# Patient Record
Sex: MALE | Race: BLACK OR AFRICAN AMERICAN
[De-identification: names, ages, dates, MRNs, and addresses within clinical notes are randomized per-mention and may not be internally consistent; named-entity substitution may affect disease eponyms.]

---

## 2020-06-21 ENCOUNTER — HOSPITAL ENCOUNTER (EMERGENCY)
Dept: HOSPITAL 72 - EMR | Age: 65
Discharge: ANOTHER HOSPITAL | End: 2020-06-21
Payer: COMMERCIAL

## 2020-06-21 VITALS — SYSTOLIC BLOOD PRESSURE: 119 MMHG | DIASTOLIC BLOOD PRESSURE: 78 MMHG

## 2020-06-21 VITALS — SYSTOLIC BLOOD PRESSURE: 94 MMHG | DIASTOLIC BLOOD PRESSURE: 67 MMHG

## 2020-06-21 VITALS — SYSTOLIC BLOOD PRESSURE: 83 MMHG | DIASTOLIC BLOOD PRESSURE: 60 MMHG

## 2020-06-21 VITALS — DIASTOLIC BLOOD PRESSURE: 88 MMHG | SYSTOLIC BLOOD PRESSURE: 125 MMHG

## 2020-06-21 VITALS — SYSTOLIC BLOOD PRESSURE: 116 MMHG | DIASTOLIC BLOOD PRESSURE: 90 MMHG

## 2020-06-21 VITALS — WEIGHT: 300 LBS | BODY MASS INDEX: 40.63 KG/M2 | HEIGHT: 72 IN

## 2020-06-21 VITALS — SYSTOLIC BLOOD PRESSURE: 92 MMHG | DIASTOLIC BLOOD PRESSURE: 54 MMHG

## 2020-06-21 VITALS — SYSTOLIC BLOOD PRESSURE: 107 MMHG | DIASTOLIC BLOOD PRESSURE: 79 MMHG

## 2020-06-21 DIAGNOSIS — J18.9: ICD-10-CM

## 2020-06-21 DIAGNOSIS — E11.9: ICD-10-CM

## 2020-06-21 DIAGNOSIS — I48.20: ICD-10-CM

## 2020-06-21 DIAGNOSIS — F10.929: Primary | ICD-10-CM

## 2020-06-21 DIAGNOSIS — E87.6: ICD-10-CM

## 2020-06-21 DIAGNOSIS — I10: ICD-10-CM

## 2020-06-21 DIAGNOSIS — E66.9: ICD-10-CM

## 2020-06-21 DIAGNOSIS — D72.829: ICD-10-CM

## 2020-06-21 LAB
ADD MANUAL DIFF: NO
ALBUMIN SERPL-MCNC: 2.6 G/DL (ref 3.4–5)
ALBUMIN/GLOB SERPL: 0.5 {RATIO} (ref 1–2.7)
ALP SERPL-CCNC: 77 U/L (ref 46–116)
ALT SERPL-CCNC: 24 U/L (ref 12–78)
ANION GAP SERPL CALC-SCNC: 13 MMOL/L (ref 5–15)
AST SERPL-CCNC: 31 U/L (ref 15–37)
BASOPHILS NFR BLD AUTO: 0.6 % (ref 0–2)
BILIRUB DIRECT SERPL-MCNC: 0.6 MG/DL (ref 0–0.3)
BILIRUB SERPL-MCNC: 1.3 MG/DL (ref 0.2–1)
BUN SERPL-MCNC: 13 MG/DL (ref 7–18)
CALCIUM SERPL-MCNC: 8.8 MG/DL (ref 8.5–10.1)
CHLORIDE SERPL-SCNC: 93 MMOL/L (ref 98–107)
CO2 SERPL-SCNC: 26 MMOL/L (ref 21–32)
CREAT SERPL-MCNC: 1.1 MG/DL (ref 0.55–1.3)
EOSINOPHIL NFR BLD AUTO: 0.2 % (ref 0–3)
ERYTHROCYTE [DISTWIDTH] IN BLOOD BY AUTOMATED COUNT: 13.8 % (ref 11.6–14.8)
GLOBULIN SER-MCNC: 5.1 G/DL
HCT VFR BLD CALC: 48.4 % (ref 42–52)
HGB BLD-MCNC: 16.5 G/DL (ref 14.2–18)
LYMPHOCYTES NFR BLD AUTO: 11.4 % (ref 20–45)
MCV RBC AUTO: 105 FL (ref 80–99)
MONOCYTES NFR BLD AUTO: 5.1 % (ref 1–10)
NEUTROPHILS NFR BLD AUTO: 82.8 % (ref 45–75)
PLATELET # BLD: 214 K/UL (ref 150–450)
POTASSIUM SERPL-SCNC: 2.8 MMOL/L (ref 3.5–5.1)
RBC # BLD AUTO: 4.63 M/UL (ref 4.7–6.1)
SODIUM SERPL-SCNC: 133 MMOL/L (ref 136–145)
WBC # BLD AUTO: 11.8 K/UL (ref 4.8–10.8)

## 2020-06-21 PROCEDURE — 84484 ASSAY OF TROPONIN QUANT: CPT

## 2020-06-21 PROCEDURE — 80053 COMPREHEN METABOLIC PANEL: CPT

## 2020-06-21 PROCEDURE — 85025 COMPLETE CBC W/AUTO DIFF WBC: CPT

## 2020-06-21 PROCEDURE — 96361 HYDRATE IV INFUSION ADD-ON: CPT

## 2020-06-21 PROCEDURE — 36415 COLL VENOUS BLD VENIPUNCTURE: CPT

## 2020-06-21 PROCEDURE — 96366 THER/PROPH/DIAG IV INF ADDON: CPT

## 2020-06-21 PROCEDURE — 87040 BLOOD CULTURE FOR BACTERIA: CPT

## 2020-06-21 PROCEDURE — 96368 THER/DIAG CONCURRENT INF: CPT

## 2020-06-21 PROCEDURE — 83605 ASSAY OF LACTIC ACID: CPT

## 2020-06-21 PROCEDURE — 83880 ASSAY OF NATRIURETIC PEPTIDE: CPT

## 2020-06-21 PROCEDURE — 80307 DRUG TEST PRSMV CHEM ANLYZR: CPT

## 2020-06-21 PROCEDURE — 82248 BILIRUBIN DIRECT: CPT

## 2020-06-21 PROCEDURE — 93005 ELECTROCARDIOGRAM TRACING: CPT

## 2020-06-21 PROCEDURE — 70450 CT HEAD/BRAIN W/O DYE: CPT

## 2020-06-21 PROCEDURE — 96365 THER/PROPH/DIAG IV INF INIT: CPT

## 2020-06-21 PROCEDURE — 99285 EMERGENCY DEPT VISIT HI MDM: CPT

## 2020-06-21 PROCEDURE — 71045 X-RAY EXAM CHEST 1 VIEW: CPT

## 2020-06-21 NOTE — NUR
ED Nurse Note:



Urine obtained by straight cath, sent to lab. Bindu held per Dr. Rod, 
HR at 101 currently.

## 2020-06-21 NOTE — NUR
ED Nurse Note:



Patient's HR currently 101. Breathing even and unlabored, no s/s of acute 
distress.

## 2020-06-21 NOTE — NUR
ED Nurse Note:

Report given to [facility]. Patient stable for transfer. IV intact and patent; 
IV fluids stop and detached for transport. Patient left with EMS via gurney. 
Belongings and transfer packet sent with patient.

## 2020-06-21 NOTE — NUR
ED Nurse Note:

Patient sleeping in bed with no acute distress. Remains in AFIB 110's BP 
125/88. Will continue to monitor closely. Awaiting EMS transport.

## 2020-06-21 NOTE — NUR
ED Nurse Note:



Patient resting in bed, patient confused at baseline. HR currently 98. Covid 
swab, lactic, and blood cultures sent to lab.

## 2020-06-21 NOTE — NUR
ED Nurse Note:

Received patient from MORGAN Castillo. Patient lethargic; arousable to painful 
stimuli. Attached to monitor; AFIB 's; hypotensive per baseline; received 
patient with BP 80/62 on left arm. pt in trendelenburg position; BP cuffed 
placed on right arm and reassessed BP 92/54. Patient presents with no 
respiratory distress. Placed gown on patient and repositioned for comfort. 
soiled clothing placed in belongings bag.

## 2020-06-21 NOTE — NUR
ED Nurse Note:



Patient BP currently 83/60, MAP is 68, patient having short runs of 
non-sustained v-tach, asymptomatic, Dr. Rod aware. Per Dr. Rod, 500 
mL bolus of NS started, patient in Trendelenberg position.

## 2020-06-21 NOTE — DIAGNOSTIC IMAGING REPORT
History: AMS

 

Exam: CT HEAD Without Contrast

Technique more: CTDI is 53.40 mGy and DLP is 1098.90 mGy-cm.

Technique more: One or more of the following dose reduction techniques 

were used: automated exposure control, adjustment of the mA and/or kV 

according to patient size, use of iterative reconstruction technique.

 

Comparison: None available

 

FINDINGS:

 

No intracranial hemorrhage, mass effect or CT evidence of acute infarct.  

Ventricles are within limits and midline.  Visualized paranasal sinuses 

and mastoids are clear.  Old left medial orbital wall fracture deformity.

 

IMPRESSION:

 

No intracranial hemorrhage, mass effect or CT evidence of acute infarct.

## 2020-06-21 NOTE — DIAGNOSTIC IMAGING REPORT
History: AMS

 

Exam: XR CXR 1 VIEW 4 images

 

Comparison: None available

 

FINDINGS:

 

Cardiac silhouette appears upper limits.  The right lung appears clear.  

Patchy opacity at the left base may represent atelectasis or developing 

infiltrate.

 

IMPRESSION:

 

Patchy opacity at the left base may represent atelectasis or developing 

infiltrate.

## 2020-06-21 NOTE — NUR
ED Nurse Note:



Called [facility] to give report. Per Clara MORA, she 
stated to call to give report after 1930 as there is no nurse to receive 
patient now.

## 2020-06-21 NOTE — NUR
HAND-OFF: 

Report given to See Muñoz RN. Patient resting in bed, NS w/ K+ running. Endorsed 
plan of care.ED Nurse Note:

## 2020-06-21 NOTE — NUR
ED Nurse Note:

Report given to MORGAN Borrego of [facility]. Patient to be admitted to TELE 
233 under the care of MD Jomar

## 2020-06-21 NOTE — NUR
ED Nurse Note:



Patient BIBA RA 29 d/t altered LOC. Patient's daughter called 911 d/t patient 
being less responsive today after drinking throughout the night. Patient  
per EMS in the field. Patient on the cardiac monitor, HR fluctuates between 
120's and 150's. Patient dozing in and out of sleep, unable to arouse for any 
significant length of time. Blood glucose 161 on Dr. Marcel molina 
aware. 18 g IV started in right AC, 20 g IV in right hand.

## 2020-06-21 NOTE — NUR
-------------------------------------------------------------------------------

          *** Note undone in EDM - 06/21/20 at 2000 by LCRISOSTOM ***          

-------------------------------------------------------------------------------

ED Nurse Note:

Received patient from MORGAN Castillo. Patient lethargic; arousable to painful 
stimuli. Attached to monitor; AFIB RVR 's; hypotensive per baseline; 
received patient with BP 80/62 on left arm. pt in trendelenburg position; BP 
cuffed placed on right arm and reassessed BP 92/54. Patient presents with no 
respiratory distress. Placed gown on patient and repositioned for comfort. 
soiled clothing placed in belongings bag.

## 2020-06-21 NOTE — EMERGENCY ROOM REPORT
History of Present Illness


General


Chief Complaint:  Alcohol Intoxication


Source:  Patient





Present Illness


HPI


64-year-old male presents for altered level of consciousness.  Brought in by 

EMS from home.  Daughter called 911.  States that he was drinking last night 

and was going in and out of consciousness.  Patient is lethargic but admits to 

alcohol use.  Denies drug use.  Unsure if he hit his head.  Per EMS patient is 

in A. fib to the 140s.  Daughter denies any history of A. fib.  Does not know 

what medications he takes.  Denies drug use.  Denies fevers or chills.  No 

other aggravating relieving factors.  Denies any other associated symptoms


Allergies:  


Coded Allergies:  


     No Known Allergies (Unverified , 6/21/20)





COVID-19 Screening


Contact w/high risk pt:  No


Recent Travel to affected area:  No


Experienced COVID-19 symptoms?:  No


COVID-19 Testing performed PTA:  No





Patient History


Past Medical History:  DM, HTN


Social History:  Reports: alcohol use; Denies: smoking, drug use


Immunizations:  UTD


Reviewed Nursing Documentation:  PMH: Agreed; PSxH: Agreed





Nursing Documentation-PMH


Past Medical History:  No History, Except For


Hx Hypertension:  Yes


Hx Diabetes:  Yes





Review of Systems


All Other Systems:  limited





Physical Exam





Vital Signs








  Date Time  Temp Pulse Resp B/P (MAP) Pulse Ox O2 Delivery O2 Flow Rate FiO2


 


6/21/20 14:36 98.1 143 20 107/79 (88) 98 Room Air  








Sp02 EP Interpretation:  reviewed, normal


General Appearance:  no apparent distress, GCS 15, non-toxic, lethargic, obese


Head:  normocephalic, atraumatic


Eyes:  bilateral eye normal inspection, bilateral eye PERRL, bilateral eye EOMI


ENT:  hearing grossly normal, normal pharynx, no angioedema, normal voice


Neck:  full range of motion, supple/symm/no masses


Respiratory:  chest non-tender, lungs clear, normal breath sounds, speaking 

full sentences


Cardiovascular #1:  regular rate, rhythm, no edema


Cardiovascular #2:  2+ carotid (R), 2+ carotid (L), 2+ radial (R), 2+ radial (L)

, 2+ dorsalis pedis (R), 2+ dorsalis pedis (L)


Gastrointestinal:  normal bowel sounds, non tender, soft, non-distended, no 

guarding, no rebound


Rectal:  deferred


Genitourinary:  normal inspection, no CVA tenderness


Musculoskeletal:  back normal, normal range of motion, gait/station normal, non-

tender


Neurologic:  motor strength/tone normal, responsive


Psychiatric:  judgement/insight normal, memory normal, mood/affect normal, no 

suicidal/homicidal ideation, other - Intoxicated


Reflexes:  3+ bicep (R), 3+ bicep (L), 3+ tricep (R), 3+ tricep (L), 3+ knee (R)

, 3+ knee (L)


Skin:  other - See nursing skin notes


Lymphatic:  no adenopathy





Medical Decision Making


Diagnostic Impression:  


 Primary Impression:  


 Acute alcoholic intoxication


 Qualified Codes:  F10.929 - Alcohol use, unspecified with intoxication, 

unspecified


 Additional Impressions:  


 Altered level of consciousness


 Hypokalemia


 A-fib


 Qualified Codes:  I48.91 - Unspecified atrial fibrillation


ER Course


Hospital Course 


64-year-old male presents with altered level of consciousness.  Reported EtOH





Differential diagnoses include: MI/unstable angina, contusion, muscle strain, 

PTX, rib fracture, pneumonia, 





Clinical course


Patient placed on stretcher. on cardiac monitor which shows A. fib with RVR.   

After initial history and physical I ordered labs, EKG, chest x-ray, CT head





lopressor ordered however patient converted prior to receiving medication





labs reviewed-noted leukocytosis, hemoglobin/hematocrit ok, potassium 2.8, 

troponin negative, EtOH negative, drug screen negative


Chest x-ray- questionable left lower lobe infiltrate


CT head no acute process





Antibiotics given.  COVID swab sent.  Potassium repleted.  Remains lethargic.  

New onset A. fib.  Will require cardiac evaluation.  Discussed findings with 

daughter





Because of insurance patient will be transferred





I.  I feel this is a highly complex case requiring extensive working including 

EKG/Rhythm strip, Xray/CT/US, Blood/urine lab work, repeat exams while in ED, 

and administration of strong opiates/narcotics for pain control, admission to 

hospital or close patient follow up.  





Diagnosis -alcohol intoxication, altered level of consciousness, hypokalemia, 

A. fib





Transferred in serious condition





Labs








Test


  6/21/20


14:55 6/21/20


15:45


 


White Blood Count


  11.8 K/UL


(4.8-10.8) 


 


 


Red Blood Count


  4.63 M/UL


(4.70-6.10) 


 


 


Hemoglobin


  16.5 G/DL


(14.2-18.0) 


 


 


Hematocrit


  48.4 %


(42.0-52.0) 


 


 


Mean Corpuscular Volume 105 FL (80-99)  


 


Mean Corpuscular Hemoglobin


  35.6 PG


(27.0-31.0) 


 


 


Mean Corpuscular Hemoglobin


Concent 34.0 G/DL


(32.0-36.0) 


 


 


Red Cell Distribution Width


  13.8 %


(11.6-14.8) 


 


 


Platelet Count


  214 K/UL


(150-450) 


 


 


Mean Platelet Volume


  6.2 FL


(6.5-10.1) 


 


 


Neutrophils (%) (Auto)


  82.8 %


(45.0-75.0) 


 


 


Lymphocytes (%) (Auto)


  11.4 %


(20.0-45.0) 


 


 


Monocytes (%) (Auto)


  5.1 %


(1.0-10.0) 


 


 


Eosinophils (%) (Auto)


  0.2 %


(0.0-3.0) 


 


 


Basophils (%) (Auto)


  0.6 %


(0.0-2.0) 


 


 


Sodium Level


  133 MMOL/L


(136-145) 


 


 


Potassium Level


  2.8 MMOL/L


(3.5-5.1) 


 


 


Chloride Level


  93 MMOL/L


() 


 


 


Carbon Dioxide Level


  26 MMOL/L


(21-32) 


 


 


Anion Gap


  13 mmol/L


(5-15) 


 


 


Blood Urea Nitrogen


  13 mg/dL


(7-18) 


 


 


Creatinine


  1.1 MG/DL


(0.55-1.30) 


 


 


Estimat Glomerular Filtration


Rate > 60 mL/min


(>60) 


 


 


Glucose Level


  152 MG/DL


() 


 


 


Calcium Level


  8.8 MG/DL


(8.5-10.1) 


 


 


Total Bilirubin


  1.3 MG/DL


(0.2-1.0) 


 


 


Direct Bilirubin


  0.6 MG/DL


(0.0-0.3) 


 


 


Aspartate Amino Transf


(AST/SGOT) 31 U/L (15-37) 


  


 


 


Alanine Aminotransferase


(ALT/SGPT) 24 U/L (12-78) 


  


 


 


Alkaline Phosphatase


  77 U/L


() 


 


 


Troponin I


  0.000 ng/mL


(0.000-0.056) 


 


 


Pro-B-Type Natriuretic Peptide


  559 pg/mL


(0-125) 


 


 


Total Protein


  7.7 G/DL


(6.4-8.2) 


 


 


Albumin


  2.6 G/DL


(3.4-5.0) 


 


 


Globulin 5.1 g/dL  


 


Albumin/Globulin Ratio 0.5 (1.0-2.7)  


 


Serum Alcohol < 3 mg/dL  


 


Urine Opiates Screen


  


  Negative


(NEGATIVE)


 


Urine Barbiturates Screen


  


  Negative


(NEGATIVE)


 


Phencyclidine (PCP) Screen


  


  Negative


(NEGATIVE)


 


Urine Amphetamines Screen


  


  Negative


(NEGATIVE)


 


Urine Benzodiazepines Screen


  


  Negative


(NEGATIVE)


 


Urine Cocaine Screen


  


  Negative


(NEGATIVE)


 


Urine Marijuana (THC) Screen


  


  Negative


(NEGATIVE)








EKG Diagnostic Results


Rate:  tachycardiac


Rhythm:  other - A. fib with RVR


ST Segments:  no acute changes


ASA given to the pt in ED:  No





Rhythm Strip Diag. Results


EP Interpretation:  yes


Rhythm:  no PVC's, no ectopy





Chest X-Ray Diagnostic Results


Chest X-Ray Diagnostic Results :  


   Chest X-Ray Ordered:  Yes


   # of Views/Limited/Complete:  1 View


   Indication:  Other - ALOC


   EP Interpretation:  Yes


   Interpretation:  no pneumothorax, other - Left lower lobe infiltrate


   Impression:  Other - Pneumonia


   Electronically Signed by:  Electronically signed by José Miguel Rod MD





CT/MRI/US Diagnostic Results


CT/MRI/US Diagnostic Results :  


   Imaging Test Ordered:  CT head


   Impression


No acute process





Last Vital Signs








  Date Time  Temp Pulse Resp B/P (MAP) Pulse Ox O2 Delivery O2 Flow Rate FiO2


 


6/21/20 15:00  101  111/78    


 


6/21/20 14:45 98.1  20  98 Room Air  








Status:  improved


Disposition:  SHORT-TERM HOSP


Condition:  Serious


Referrals:  


NON PHYSICIAN (PCP)











José Miguel Rod MD Jun 21, 2020 16:53